# Patient Record
Sex: MALE | Race: ASIAN | Employment: STUDENT | URBAN - METROPOLITAN AREA
[De-identification: names, ages, dates, MRNs, and addresses within clinical notes are randomized per-mention and may not be internally consistent; named-entity substitution may affect disease eponyms.]

---
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
## 2021-05-26 ENCOUNTER — OFFICE VISIT (OUTPATIENT)
Dept: FAMILY MEDICINE CLINIC | Facility: CLINIC | Age: 21
End: 2021-05-26
Payer: COMMERCIAL

## 2021-05-26 VITALS
BODY MASS INDEX: 22.12 KG/M2 | HEART RATE: 80 BPM | RESPIRATION RATE: 14 BRPM | OXYGEN SATURATION: 98 % | WEIGHT: 158 LBS | DIASTOLIC BLOOD PRESSURE: 70 MMHG | HEIGHT: 71 IN | TEMPERATURE: 97 F | SYSTOLIC BLOOD PRESSURE: 100 MMHG

## 2021-05-26 DIAGNOSIS — H60.12 CELLULITIS OF LEFT EXTERNAL EAR: Primary | ICD-10-CM

## 2021-05-26 PROCEDURE — 99213 OFFICE O/P EST LOW 20 MIN: CPT | Performed by: FAMILY MEDICINE

## 2021-05-26 PROCEDURE — 3078F DIAST BP <80 MM HG: CPT | Performed by: FAMILY MEDICINE

## 2021-05-26 PROCEDURE — 3074F SYST BP LT 130 MM HG: CPT | Performed by: FAMILY MEDICINE

## 2021-05-26 PROCEDURE — 3008F BODY MASS INDEX DOCD: CPT | Performed by: FAMILY MEDICINE

## 2021-05-26 RX ORDER — AMOXICILLIN 500 MG/1
500 CAPSULE ORAL 2 TIMES DAILY
Qty: 20 CAPSULE | Refills: 0 | Status: SHIPPED | OUTPATIENT
Start: 2021-05-26 | End: 2021-06-05

## 2021-05-26 NOTE — PROGRESS NOTES
HPI/Subjective:   Patient ID: Remigio Magana is a 24year old male.     HPI  Mr. Qasim Romero is a pleasant 43-year-old gentleman who is generally healthy here today for redness and swelling of his left upper ear for the past 2 weeks associated with Requested Prescriptions     Signed Prescriptions Disp Refills   • amoxicillin 500 MG Oral Cap 20 capsule 0     Sig: Take 1 capsule (500 mg total) by mouth 2 (two) times daily for 10 days.        Imaging & Referrals:  None

## 2021-05-26 NOTE — PATIENT INSTRUCTIONS
Thank you for choosing Kaitlin Mojica MD at [place] [address]  To Do: Ulisses Medina  1. Please take meds as directed. Terrance Shultz is located in [address]. Monday, Tuesday & Friday – 8 a.m. to 4 p.m.   Wednesday, Thursday – 7 a.m. t outweigh those potential risks and we strive to make you healthier and to improve your quality of life.     Referrals, and Radiology Information:    If your insurance requires a referral to a specialist, please allow 5 business days to process your referral

## 2021-06-15 ENCOUNTER — TELEPHONE (OUTPATIENT)
Dept: FAMILY MEDICINE CLINIC | Facility: CLINIC | Age: 21
End: 2021-06-15

## 2021-06-15 NOTE — TELEPHONE ENCOUNTER
Pt would like to have a refill on the antibiotics. He states that the redness in ear  is still there.

## 2021-06-16 NOTE — TELEPHONE ENCOUNTER
Future Appointments   Date Time Provider Rocio Burt   6/23/2021  4:30 PM Emani Gusman MD [facility] [address] [facility] [address]

## 2021-06-23 ENCOUNTER — OFFICE VISIT (OUTPATIENT)
Dept: FAMILY MEDICINE CLINIC | Facility: CLINIC | Age: 21
End: 2021-06-23
Payer: COMMERCIAL

## 2021-06-23 VITALS
DIASTOLIC BLOOD PRESSURE: 80 MMHG | RESPIRATION RATE: 16 BRPM | WEIGHT: 157 LBS | HEIGHT: 71 IN | OXYGEN SATURATION: 98 % | SYSTOLIC BLOOD PRESSURE: 116 MMHG | HEART RATE: 54 BPM | BODY MASS INDEX: 21.98 KG/M2 | TEMPERATURE: 98 F

## 2021-06-23 DIAGNOSIS — H60.12 CELLULITIS OF LEFT EXTERNAL EAR: Primary | ICD-10-CM

## 2021-06-23 PROCEDURE — 3008F BODY MASS INDEX DOCD: CPT | Performed by: FAMILY MEDICINE

## 2021-06-23 PROCEDURE — 99213 OFFICE O/P EST LOW 20 MIN: CPT | Performed by: FAMILY MEDICINE

## 2021-06-23 PROCEDURE — 3079F DIAST BP 80-89 MM HG: CPT | Performed by: FAMILY MEDICINE

## 2021-06-23 PROCEDURE — 3074F SYST BP LT 130 MM HG: CPT | Performed by: FAMILY MEDICINE

## 2021-06-23 RX ORDER — DOXYCYCLINE HYCLATE 100 MG
100 TABLET ORAL 2 TIMES DAILY
Qty: 28 TABLET | Refills: 0 | Status: SHIPPED | OUTPATIENT
Start: 2021-06-23 | End: 2021-07-07

## 2021-06-23 NOTE — PROGRESS NOTES
HPI/Subjective:   Patient ID: Neo Diaz is a 24year old male. HPI  Mr. Greg Nunez is a pleasant 71-year-old gentleman who I saw last month for cellulitis of the left external ear and was treated with amoxicillin.   He thinks that that and correct errors during dictation discrepancies may still exist          No orders of the defined types were placed in this encounter.       Meds This Visit:  Requested Prescriptions     Signed Prescriptions Disp Refills   • Doxycycline Hyclate 100 MG Oral Ta

## 2021-06-23 NOTE — PATIENT INSTRUCTIONS
Thank you for choosing Ольга Caro MD at [place] [address]  To Do: Ulisses Medina  1. Please take meds as directed. Terrance Shultz is located in [address]. Monday, Tuesday & Friday – 8 a.m. to 4 p.m.   Wednesday, Thursday – 7 a.m. t outweigh those potential risks and we strive to make you healthier and to improve your quality of life.     Referrals, and Radiology Information:    If your insurance requires a referral to a specialist, please allow 5 business days to process your referral the full number of days until they are gone. Keep taking the medicine even if your symptoms go away.    Home care  Follow these tips:  · Limit the use of the part of your body with cellulitis.   · If the infection is on your leg, keep your leg raised while

## 2021-07-08 ENCOUNTER — PATIENT MESSAGE (OUTPATIENT)
Dept: FAMILY MEDICINE CLINIC | Facility: CLINIC | Age: 21
End: 2021-07-08

## 2021-07-08 DIAGNOSIS — H60.12 CELLULITIS OF LEFT EXTERNAL EAR: Primary | ICD-10-CM

## 2021-07-08 NOTE — TELEPHONE ENCOUNTER
From: Huston Schlatter  To: Sol Schmidt MD  Sent: 7/8/2021 2:42 PM CDT  Subject: Prescription Question    Hi Dr. Ena Dow,    I had an appointment with you almost 2 weeks ago regarding my ear infection.  Since then, we had switched to a different

## 2021-07-08 NOTE — TELEPHONE ENCOUNTER
Please refer to dermatology, whoever Dr. Gloria Brennan uses or Dr. Palma Cota if not sure who this is.

## 2021-07-08 NOTE — TELEPHONE ENCOUNTER
See pt message, please advise, pt to make appointment, refer to ENT or dermatology?     Per last OV note:    Cellulitis of left external ear  (primary encounter diagnosis)  -Unclear if this is truly infectious; however I will put on trial of doxycycline 100

## 2022-01-10 ENCOUNTER — TELEMEDICINE (OUTPATIENT)
Dept: FAMILY MEDICINE CLINIC | Facility: CLINIC | Age: 22
End: 2022-01-10

## 2022-01-10 DIAGNOSIS — R05.3 CHRONIC COUGH: Primary | ICD-10-CM

## 2022-01-10 PROCEDURE — 99214 OFFICE O/P EST MOD 30 MIN: CPT | Performed by: FAMILY MEDICINE

## 2022-01-10 RX ORDER — AZITHROMYCIN 250 MG/1
TABLET, FILM COATED ORAL
Qty: 6 TABLET | Refills: 0 | Status: SHIPPED | OUTPATIENT
Start: 2022-01-10 | End: 2022-01-15

## 2022-01-10 RX ORDER — MONTELUKAST SODIUM 10 MG/1
10 TABLET ORAL DAILY
Qty: 30 TABLET | Refills: 3 | Status: SHIPPED | OUTPATIENT
Start: 2022-01-10 | End: 2023-01-05

## 2022-01-10 NOTE — PROGRESS NOTES
Subjective:   Patient ID: Kimani Mullen is a 22year old male. DUSTY Kebede is a very pleasant 27-year-old gentleman who is generally healthy presented for video visit today. He has had dry cough for the past 5 months.   He had viral URI prior have been corrected.  While every attempt is made to correct errors during dictation discrepancies may still exist        Total face to face time was 15 min, more than 50% of the time was spent in counseling and/or coordination of care related to chronic co

## 2022-01-10 NOTE — PATIENT INSTRUCTIONS
Thank you for choosing Katarina Carlso MD at [place] [address]  To Do: Ulisses Medina  1. Please take meds as directed. Terrance Vikas Martin is located in [address]. Monday, Tuesday & Friday – 8 a.m. to 4 p.m.   Wednesday, Thursday – 7 a.m. t outweigh those potential risks and we strive to make you healthier and to improve your quality of life.     Referrals, and Radiology Information:    If your insurance requires a referral to a specialist, please allow 5 business days to process your referral

## 2022-06-30 ENCOUNTER — TELEMEDICINE (OUTPATIENT)
Dept: FAMILY MEDICINE CLINIC | Facility: CLINIC | Age: 22
End: 2022-06-30

## 2022-06-30 DIAGNOSIS — K13.0 MUCOCELE OF LOWER LIP: Primary | ICD-10-CM

## 2022-12-21 ENCOUNTER — TELEPHONE (OUTPATIENT)
Dept: FAMILY MEDICINE CLINIC | Facility: CLINIC | Age: 22
End: 2022-12-21

## 2022-12-21 DIAGNOSIS — Z00.00 ROUTINE ADULT HEALTH MAINTENANCE: Primary | ICD-10-CM

## 2022-12-21 NOTE — TELEPHONE ENCOUNTER
Pt requesting lab orders prior to upcoming appointment. Pt has not had labs, ok to order annual labs?

## 2022-12-21 NOTE — TELEPHONE ENCOUNTER
Future Appointments   Date Time Provider Rocio Carmel   1/16/2023  1:20 PM Yadira De La Cruz MD [facility] [address] [facility] [address]     Patient requesting lab orders, please advise.

## 2023-01-13 ENCOUNTER — OFFICE VISIT (OUTPATIENT)
Dept: FAMILY MEDICINE CLINIC | Facility: CLINIC | Age: 23
End: 2023-01-13
Payer: COMMERCIAL

## 2023-01-13 VITALS
WEIGHT: 166 LBS | DIASTOLIC BLOOD PRESSURE: 70 MMHG | OXYGEN SATURATION: 99 % | SYSTOLIC BLOOD PRESSURE: 120 MMHG | RESPIRATION RATE: 16 BRPM | HEIGHT: 71 IN | TEMPERATURE: 97 F | BODY MASS INDEX: 23.24 KG/M2 | HEART RATE: 71 BPM

## 2023-01-13 DIAGNOSIS — R68.89 NASAL AIRWAY ABNORMALITY: ICD-10-CM

## 2023-01-13 DIAGNOSIS — N50.89 SCROTAL SWELLING: ICD-10-CM

## 2023-01-13 DIAGNOSIS — Z00.00 WELL ADULT EXAM: Primary | ICD-10-CM

## 2023-01-13 DIAGNOSIS — E55.9 VITAMIN D DEFICIENCY: ICD-10-CM

## 2023-01-13 PROCEDURE — 3078F DIAST BP <80 MM HG: CPT | Performed by: FAMILY MEDICINE

## 2023-01-13 PROCEDURE — 3008F BODY MASS INDEX DOCD: CPT | Performed by: FAMILY MEDICINE

## 2023-01-13 PROCEDURE — 3074F SYST BP LT 130 MM HG: CPT | Performed by: FAMILY MEDICINE

## 2023-01-13 PROCEDURE — 99395 PREV VISIT EST AGE 18-39: CPT | Performed by: FAMILY MEDICINE

## 2023-01-13 PROCEDURE — 99213 OFFICE O/P EST LOW 20 MIN: CPT | Performed by: FAMILY MEDICINE

## 2023-01-13 RX ORDER — KETOCONAZOLE 20 MG/ML
SHAMPOO TOPICAL
COMMUNITY
Start: 2023-01-12

## 2023-08-18 ENCOUNTER — LAB ENCOUNTER (OUTPATIENT)
Dept: LAB | Age: 23
End: 2023-08-18
Attending: FAMILY MEDICINE
Payer: COMMERCIAL

## 2023-08-18 DIAGNOSIS — Z00.00 WELL ADULT EXAM: ICD-10-CM

## 2023-08-18 DIAGNOSIS — E55.9 VITAMIN D DEFICIENCY: ICD-10-CM

## 2023-08-18 LAB
ALBUMIN SERPL-MCNC: 4.2 G/DL (ref 3.4–5)
ALBUMIN/GLOB SERPL: 1.2 {RATIO} (ref 1–2)
ALP LIVER SERPL-CCNC: 76 U/L
ALT SERPL-CCNC: 40 U/L
ANION GAP SERPL CALC-SCNC: 4 MMOL/L (ref 0–18)
AST SERPL-CCNC: 28 U/L (ref 15–37)
BASOPHILS # BLD AUTO: 0.05 X10(3) UL (ref 0–0.2)
BASOPHILS NFR BLD AUTO: 0.9 %
BILIRUB SERPL-MCNC: 0.6 MG/DL (ref 0.1–2)
BUN BLD-MCNC: 21 MG/DL (ref 7–18)
CALCIUM BLD-MCNC: 9.5 MG/DL (ref 8.5–10.1)
CHLORIDE SERPL-SCNC: 106 MMOL/L (ref 98–112)
CHOLEST SERPL-MCNC: 184 MG/DL (ref ?–200)
CO2 SERPL-SCNC: 28 MMOL/L (ref 21–32)
CREAT BLD-MCNC: 1.23 MG/DL
EGFRCR SERPLBLD CKD-EPI 2021: 86 ML/MIN/1.73M2 (ref 60–?)
EOSINOPHIL # BLD AUTO: 0.38 X10(3) UL (ref 0–0.7)
EOSINOPHIL NFR BLD AUTO: 6.9 %
ERYTHROCYTE [DISTWIDTH] IN BLOOD BY AUTOMATED COUNT: 12.6 %
EST. AVERAGE GLUCOSE BLD GHB EST-MCNC: 114 MG/DL (ref 68–126)
FASTING PATIENT LIPID ANSWER: YES
FASTING STATUS PATIENT QL REPORTED: YES
GLOBULIN PLAS-MCNC: 3.4 G/DL (ref 2.8–4.4)
GLUCOSE BLD-MCNC: 86 MG/DL (ref 70–99)
HBA1C MFR BLD: 5.6 % (ref ?–5.7)
HCT VFR BLD AUTO: 51.4 %
HDLC SERPL-MCNC: 67 MG/DL (ref 40–59)
HGB BLD-MCNC: 17.3 G/DL
IMM GRANULOCYTES # BLD AUTO: 0.07 X10(3) UL (ref 0–1)
IMM GRANULOCYTES NFR BLD: 1.3 %
LDLC SERPL CALC-MCNC: 102 MG/DL (ref ?–100)
LYMPHOCYTES # BLD AUTO: 1.79 X10(3) UL (ref 1–4)
LYMPHOCYTES NFR BLD AUTO: 32.6 %
MCH RBC QN AUTO: 29.2 PG (ref 26–34)
MCHC RBC AUTO-ENTMCNC: 33.7 G/DL (ref 31–37)
MCV RBC AUTO: 86.8 FL
MONOCYTES # BLD AUTO: 0.46 X10(3) UL (ref 0.1–1)
MONOCYTES NFR BLD AUTO: 8.4 %
NEUTROPHILS # BLD AUTO: 2.74 X10 (3) UL (ref 1.5–7.7)
NEUTROPHILS # BLD AUTO: 2.74 X10(3) UL (ref 1.5–7.7)
NEUTROPHILS NFR BLD AUTO: 49.9 %
NONHDLC SERPL-MCNC: 117 MG/DL (ref ?–130)
OSMOLALITY SERPL CALC.SUM OF ELEC: 288 MOSM/KG (ref 275–295)
PLATELET # BLD AUTO: 196 10(3)UL (ref 150–450)
POTASSIUM SERPL-SCNC: 4.2 MMOL/L (ref 3.5–5.1)
PROT SERPL-MCNC: 7.6 G/DL (ref 6.4–8.2)
RBC # BLD AUTO: 5.92 X10(6)UL
SODIUM SERPL-SCNC: 138 MMOL/L (ref 136–145)
TRIGL SERPL-MCNC: 81 MG/DL (ref 30–149)
TSI SER-ACNC: 2.43 MIU/ML (ref 0.36–3.74)
VIT D+METAB SERPL-MCNC: 30.8 NG/ML (ref 30–100)
VLDLC SERPL CALC-MCNC: 13 MG/DL (ref 0–30)
WBC # BLD AUTO: 5.5 X10(3) UL (ref 4–11)

## 2023-08-18 PROCEDURE — 80050 GENERAL HEALTH PANEL: CPT | Performed by: FAMILY MEDICINE

## 2023-08-18 PROCEDURE — 82306 VITAMIN D 25 HYDROXY: CPT | Performed by: FAMILY MEDICINE

## 2023-08-18 PROCEDURE — 83036 HEMOGLOBIN GLYCOSYLATED A1C: CPT | Performed by: FAMILY MEDICINE

## 2023-08-18 PROCEDURE — 80061 LIPID PANEL: CPT | Performed by: FAMILY MEDICINE

## 2024-05-07 ENCOUNTER — OFFICE VISIT (OUTPATIENT)
Facility: LOCATION | Age: 24
End: 2024-05-07
Payer: COMMERCIAL

## 2024-05-07 ENCOUNTER — TELEPHONE (OUTPATIENT)
Facility: LOCATION | Age: 24
End: 2024-05-07

## 2024-05-07 VITALS — HEART RATE: 50 BPM | TEMPERATURE: 97 F

## 2024-05-07 DIAGNOSIS — K40.90 UNILATERAL INGUINAL HERNIA WITHOUT OBSTRUCTION OR GANGRENE, RECURRENCE NOT SPECIFIED: Primary | ICD-10-CM

## 2024-05-07 PROCEDURE — 99204 OFFICE O/P NEW MOD 45 MIN: CPT | Performed by: STUDENT IN AN ORGANIZED HEALTH CARE EDUCATION/TRAINING PROGRAM

## 2024-05-07 NOTE — TELEPHONE ENCOUNTER
Patient dropped off imaging with Radiology. Radiology says that it's in the system, but not to check Epic, that it would be in the PAC system. Patient wants to know if we have the imaging.     Please advise  Best callback number is [phone]

## 2024-05-08 ENCOUNTER — TELEPHONE (OUTPATIENT)
Facility: LOCATION | Age: 24
End: 2024-05-08

## 2024-05-08 NOTE — TELEPHONE ENCOUNTER
No prior authorization required for cpt code 55553. Spoke with Eli @ 8:23am on 5/8/24   02-Aug-2018 16:50 03-Aug-2018 20:53

## 2024-05-19 ENCOUNTER — ANESTHESIA EVENT (OUTPATIENT)
Dept: SURGERY | Facility: HOSPITAL | Age: 24
End: 2024-05-19

## 2024-05-19 NOTE — ANESTHESIA PREPROCEDURE EVALUATION
PRE-OP EVALUATION    Patient Name: Ulisses Medina    Admit Diagnosis: Unilateral inguinal hernia without obstruction or gangrene, recurrence not specified [K40.90]    Pre-op Diagnosis: Unilateral inguinal hernia without obstruction or gangrene, recurrence not specified [K40.90]    LEFT INGUINAL HERNIA REPAIR WITH MESH PLUG    Anesthesia Procedure: LEFT INGUINAL HERNIA REPAIR WITH MESH PLUG (Left)    Surgeons and Role:     * Josias Kothari MD - Primary    Pre-op vitals reviewed.        Body mass index is 24.05 kg/m².    Current medications reviewed.  Hospital Medications:  No current facility-administered medications on file as of .       Outpatient Medications:     No medications prior to admission.       Allergies: Patient has no known allergies.      Anesthesia Evaluation    Patient summary reviewed.    Anesthetic Complications  (-) history of anesthetic complications         GI/Hepatic/Renal    Negative GI/hepatic/renal ROS.                             Cardiovascular    Negative cardiovascular ROS.                                                   Endo/Other    Negative endo/other ROS.                              Pulmonary    Negative pulmonary ROS.                       Neuro/Psych    Negative neuro/psych ROS.                                  Past Surgical History:   Procedure Laterality Date    Patient denies any surgical history       Social History     Socioeconomic History    Marital status: Single   Tobacco Use    Smoking status: Never     Passive exposure: Never    Smokeless tobacco: Never   Vaping Use    Vaping status: Never Used   Substance and Sexual Activity    Alcohol use: Never    Drug use: Never     History   Drug Use Unknown     Available pre-op labs reviewed.               Airway      Mallampati: I  Mouth opening: >3 FB  TM distance: > 6 cm  Neck ROM: full Cardiovascular    Cardiovascular exam normal.         Dental    Dentition appears grossly intact          Pulmonary    Pulmonary exam normal.                 Other findings              ASA: 1   Plan: general  NPO status verified and           Plan/risks discussed with: patient                Present on Admission:  **None**

## 2024-05-20 ENCOUNTER — HOSPITAL ENCOUNTER (OUTPATIENT)
Facility: HOSPITAL | Age: 24
Setting detail: HOSPITAL OUTPATIENT SURGERY
Discharge: HOME | End: 2024-05-20
Attending: STUDENT IN AN ORGANIZED HEALTH CARE EDUCATION/TRAINING PROGRAM | Admitting: STUDENT IN AN ORGANIZED HEALTH CARE EDUCATION/TRAINING PROGRAM

## 2024-05-20 ENCOUNTER — ANESTHESIA (OUTPATIENT)
Dept: SURGERY | Facility: HOSPITAL | Age: 24
End: 2024-05-20

## 2024-05-20 VITALS
WEIGHT: 176.38 LBS | DIASTOLIC BLOOD PRESSURE: 72 MMHG | TEMPERATURE: 98 F | HEART RATE: 80 BPM | OXYGEN SATURATION: 97 % | BODY MASS INDEX: 24.97 KG/M2 | SYSTOLIC BLOOD PRESSURE: 120 MMHG | RESPIRATION RATE: 18 BRPM | HEIGHT: 70.5 IN

## 2024-05-20 DIAGNOSIS — K40.90 UNILATERAL INGUINAL HERNIA WITHOUT OBSTRUCTION OR GANGRENE, RECURRENCE NOT SPECIFIED: ICD-10-CM

## 2024-05-20 DIAGNOSIS — G89.18 POSTOPERATIVE PAIN: Primary | ICD-10-CM

## 2024-05-20 PROCEDURE — 88304 TISSUE EXAM BY PATHOLOGIST: CPT | Performed by: STUDENT IN AN ORGANIZED HEALTH CARE EDUCATION/TRAINING PROGRAM

## 2024-05-20 PROCEDURE — 0YU60JZ SUPPLEMENT LEFT INGUINAL REGION WITH SYNTHETIC SUBSTITUTE, OPEN APPROACH: ICD-10-PCS | Performed by: STUDENT IN AN ORGANIZED HEALTH CARE EDUCATION/TRAINING PROGRAM

## 2024-05-20 PROCEDURE — 88307 TISSUE EXAM BY PATHOLOGIST: CPT | Performed by: STUDENT IN AN ORGANIZED HEALTH CARE EDUCATION/TRAINING PROGRAM

## 2024-05-20 DEVICE — BARD MESH
Type: IMPLANTABLE DEVICE | Site: INGUINAL | Status: FUNCTIONAL
Brand: BARD MESH

## 2024-05-20 RX ORDER — PROCHLORPERAZINE EDISYLATE 5 MG/ML
5 INJECTION INTRAMUSCULAR; INTRAVENOUS EVERY 8 HOURS PRN
Status: DISCONTINUED | OUTPATIENT
Start: 2024-05-20 | End: 2024-05-20

## 2024-05-20 RX ORDER — SODIUM CHLORIDE, SODIUM LACTATE, POTASSIUM CHLORIDE, CALCIUM CHLORIDE 600; 310; 30; 20 MG/100ML; MG/100ML; MG/100ML; MG/100ML
INJECTION, SOLUTION INTRAVENOUS CONTINUOUS
Status: DISCONTINUED | OUTPATIENT
Start: 2024-05-20 | End: 2024-05-20

## 2024-05-20 RX ORDER — ONDANSETRON 2 MG/ML
4 INJECTION INTRAMUSCULAR; INTRAVENOUS EVERY 6 HOURS PRN
Status: DISCONTINUED | OUTPATIENT
Start: 2024-05-20 | End: 2024-05-20

## 2024-05-20 RX ORDER — KETOROLAC TROMETHAMINE 30 MG/ML
INJECTION, SOLUTION INTRAMUSCULAR; INTRAVENOUS AS NEEDED
Status: DISCONTINUED | OUTPATIENT
Start: 2024-05-20 | End: 2024-05-20 | Stop reason: SURG

## 2024-05-20 RX ORDER — LIDOCAINE HYDROCHLORIDE 10 MG/ML
INJECTION, SOLUTION EPIDURAL; INFILTRATION; INTRACAUDAL; PERINEURAL AS NEEDED
Status: DISCONTINUED | OUTPATIENT
Start: 2024-05-20 | End: 2024-05-20 | Stop reason: SURG

## 2024-05-20 RX ORDER — NALOXONE HYDROCHLORIDE 0.4 MG/ML
0.08 INJECTION, SOLUTION INTRAMUSCULAR; INTRAVENOUS; SUBCUTANEOUS AS NEEDED
Status: DISCONTINUED | OUTPATIENT
Start: 2024-05-20 | End: 2024-05-20

## 2024-05-20 RX ORDER — HYDROCODONE BITARTRATE AND ACETAMINOPHEN 5; 325 MG/1; MG/1
2 TABLET ORAL ONCE AS NEEDED
Status: COMPLETED | OUTPATIENT
Start: 2024-05-20 | End: 2024-05-20

## 2024-05-20 RX ORDER — HYDROMORPHONE HYDROCHLORIDE 1 MG/ML
0.6 INJECTION, SOLUTION INTRAMUSCULAR; INTRAVENOUS; SUBCUTANEOUS EVERY 5 MIN PRN
Status: DISCONTINUED | OUTPATIENT
Start: 2024-05-20 | End: 2024-05-20

## 2024-05-20 RX ORDER — OXYCODONE HYDROCHLORIDE 5 MG/1
5 TABLET ORAL EVERY 6 HOURS PRN
Qty: 15 TABLET | Refills: 0 | Status: SHIPPED | OUTPATIENT
Start: 2024-05-20

## 2024-05-20 RX ORDER — ACETAMINOPHEN 500 MG
1000 TABLET ORAL ONCE
Status: DISCONTINUED | OUTPATIENT
Start: 2024-05-20 | End: 2024-05-20 | Stop reason: HOSPADM

## 2024-05-20 RX ORDER — MIDAZOLAM HYDROCHLORIDE 1 MG/ML
1 INJECTION INTRAMUSCULAR; INTRAVENOUS EVERY 5 MIN PRN
Status: DISCONTINUED | OUTPATIENT
Start: 2024-05-20 | End: 2024-05-20

## 2024-05-20 RX ORDER — ONDANSETRON 2 MG/ML
INJECTION INTRAMUSCULAR; INTRAVENOUS AS NEEDED
Status: DISCONTINUED | OUTPATIENT
Start: 2024-05-20 | End: 2024-05-20 | Stop reason: SURG

## 2024-05-20 RX ORDER — HYDROCODONE BITARTRATE AND ACETAMINOPHEN 5; 325 MG/1; MG/1
1 TABLET ORAL ONCE AS NEEDED
Status: COMPLETED | OUTPATIENT
Start: 2024-05-20 | End: 2024-05-20

## 2024-05-20 RX ORDER — BUPIVACAINE HYDROCHLORIDE 2.5 MG/ML
INJECTION, SOLUTION EPIDURAL; INFILTRATION; INTRACAUDAL AS NEEDED
Status: DISCONTINUED | OUTPATIENT
Start: 2024-05-20 | End: 2024-05-20 | Stop reason: HOSPADM

## 2024-05-20 RX ORDER — HYDROMORPHONE HYDROCHLORIDE 1 MG/ML
0.2 INJECTION, SOLUTION INTRAMUSCULAR; INTRAVENOUS; SUBCUTANEOUS EVERY 5 MIN PRN
Status: DISCONTINUED | OUTPATIENT
Start: 2024-05-20 | End: 2024-05-20

## 2024-05-20 RX ORDER — DEXAMETHASONE SODIUM PHOSPHATE 4 MG/ML
VIAL (ML) INJECTION AS NEEDED
Status: DISCONTINUED | OUTPATIENT
Start: 2024-05-20 | End: 2024-05-20 | Stop reason: SURG

## 2024-05-20 RX ORDER — ACETAMINOPHEN 500 MG
1000 TABLET ORAL ONCE AS NEEDED
Status: COMPLETED | OUTPATIENT
Start: 2024-05-20 | End: 2024-05-20

## 2024-05-20 RX ORDER — LABETALOL HYDROCHLORIDE 5 MG/ML
5 INJECTION, SOLUTION INTRAVENOUS EVERY 5 MIN PRN
Status: DISCONTINUED | OUTPATIENT
Start: 2024-05-20 | End: 2024-05-20

## 2024-05-20 RX ORDER — IBUPROFEN 600 MG/1
600 TABLET ORAL ONCE AS NEEDED
Status: DISCONTINUED | OUTPATIENT
Start: 2024-05-20 | End: 2024-05-20

## 2024-05-20 RX ORDER — HYDROMORPHONE HYDROCHLORIDE 1 MG/ML
INJECTION, SOLUTION INTRAMUSCULAR; INTRAVENOUS; SUBCUTANEOUS
Status: COMPLETED
Start: 2024-05-20 | End: 2024-05-20

## 2024-05-20 RX ORDER — HYDROMORPHONE HYDROCHLORIDE 1 MG/ML
0.4 INJECTION, SOLUTION INTRAMUSCULAR; INTRAVENOUS; SUBCUTANEOUS EVERY 5 MIN PRN
Status: DISCONTINUED | OUTPATIENT
Start: 2024-05-20 | End: 2024-05-20

## 2024-05-20 RX ORDER — ROCURONIUM BROMIDE 10 MG/ML
INJECTION, SOLUTION INTRAVENOUS AS NEEDED
Status: DISCONTINUED | OUTPATIENT
Start: 2024-05-20 | End: 2024-05-20 | Stop reason: SURG

## 2024-05-20 RX ORDER — MEPERIDINE HYDROCHLORIDE 25 MG/ML
INJECTION INTRAMUSCULAR; INTRAVENOUS; SUBCUTANEOUS
Status: COMPLETED
Start: 2024-05-20 | End: 2024-05-20

## 2024-05-20 RX ORDER — SCOLOPAMINE TRANSDERMAL SYSTEM 1 MG/1
1 PATCH, EXTENDED RELEASE TRANSDERMAL ONCE
Status: DISCONTINUED | OUTPATIENT
Start: 2024-05-20 | End: 2024-05-20 | Stop reason: HOSPADM

## 2024-05-20 RX ORDER — MIDAZOLAM HYDROCHLORIDE 1 MG/ML
INJECTION INTRAMUSCULAR; INTRAVENOUS AS NEEDED
Status: DISCONTINUED | OUTPATIENT
Start: 2024-05-20 | End: 2024-05-20 | Stop reason: SURG

## 2024-05-20 RX ORDER — HEPARIN SODIUM 5000 [USP'U]/ML
5000 INJECTION, SOLUTION INTRAVENOUS; SUBCUTANEOUS ONCE
Status: COMPLETED | OUTPATIENT
Start: 2024-05-20 | End: 2024-05-20

## 2024-05-20 RX ORDER — CEFAZOLIN SODIUM 1 G/3ML
INJECTION, POWDER, FOR SOLUTION INTRAMUSCULAR; INTRAVENOUS AS NEEDED
Status: DISCONTINUED | OUTPATIENT
Start: 2024-05-20 | End: 2024-05-20 | Stop reason: SURG

## 2024-05-20 RX ORDER — MEPERIDINE HYDROCHLORIDE 25 MG/ML
12.5 INJECTION INTRAMUSCULAR; INTRAVENOUS; SUBCUTANEOUS AS NEEDED
Status: DISCONTINUED | OUTPATIENT
Start: 2024-05-20 | End: 2024-05-20

## 2024-05-20 RX ADMIN — ONDANSETRON 4 MG: 2 INJECTION INTRAMUSCULAR; INTRAVENOUS at 09:59:00

## 2024-05-20 RX ADMIN — DEXAMETHASONE SODIUM PHOSPHATE 4 MG: 4 MG/ML VIAL (ML) INJECTION at 09:58:00

## 2024-05-20 RX ADMIN — SODIUM CHLORIDE, SODIUM LACTATE, POTASSIUM CHLORIDE, CALCIUM CHLORIDE: 600; 310; 30; 20 INJECTION, SOLUTION INTRAVENOUS at 11:54:00

## 2024-05-20 RX ADMIN — SODIUM CHLORIDE, SODIUM LACTATE, POTASSIUM CHLORIDE, CALCIUM CHLORIDE: 600; 310; 30; 20 INJECTION, SOLUTION INTRAVENOUS at 09:45:00

## 2024-05-20 RX ADMIN — CEFAZOLIN SODIUM 2 G: 1 INJECTION, POWDER, FOR SOLUTION INTRAMUSCULAR; INTRAVENOUS at 10:04:00

## 2024-05-20 RX ADMIN — LIDOCAINE HYDROCHLORIDE 100 MG: 10 INJECTION, SOLUTION EPIDURAL; INFILTRATION; INTRACAUDAL; PERINEURAL at 09:52:00

## 2024-05-20 RX ADMIN — ROCURONIUM BROMIDE 50 MG: 10 INJECTION, SOLUTION INTRAVENOUS at 09:53:00

## 2024-05-20 RX ADMIN — SODIUM CHLORIDE, SODIUM LACTATE, POTASSIUM CHLORIDE, CALCIUM CHLORIDE: 600; 310; 30; 20 INJECTION, SOLUTION INTRAVENOUS at 11:46:00

## 2024-05-20 RX ADMIN — KETOROLAC TROMETHAMINE 30 MG: 30 INJECTION, SOLUTION INTRAMUSCULAR; INTRAVENOUS at 11:39:00

## 2024-05-20 RX ADMIN — MIDAZOLAM HYDROCHLORIDE 2 MG: 1 INJECTION INTRAMUSCULAR; INTRAVENOUS at 09:47:00

## 2024-05-20 NOTE — ANESTHESIA POSTPROCEDURE EVALUATION
[facility] Demonddebbie Medina Patient Status:  Hospital Outpatient Surgery   Age/Gender 23 year old male MRN [id number]   Location [facility] POST ANESTHESIA CARE UNIT Attending Josias Kothari MD   Hosp Day # 0 PCP Edinson Sanon MD       Anesthesia Post-op Note    LEFT INGUINAL HERNIA REPAIR WITH MESH    Procedure Summary       Date: 05/20/24 Room / Location:  MAIN OR 04 /  MAIN OR    Anesthesia Start: 0945 Anesthesia Stop: 1206    Procedure: LEFT INGUINAL HERNIA REPAIR WITH MESH (Left: Abdomen) Diagnosis:       Unilateral inguinal hernia without obstruction or gangrene, recurrence not specified      (Unilateral inguinal hernia without obstruction or gangrene, recurrence not specified [K40.90])    Surgeons: Josias Kothari MD Anesthesiologist: Ganesh Wood MD    Anesthesia Type: general ASA Status: 1            Anesthesia Type: general    Vitals Value Taken Time   /66 05/20/24 1204   Temp 98.3 05/20/24 1206   Pulse 62 05/20/24 1205   Resp 13 05/20/24 1205   SpO2 100 % 05/20/24 1205   Vitals shown include unfiled device data.    Patient Location: PACU    Anesthesia Type: general    Airway Patency: patent, extubated and oral/nasal airway    Postop Pain Control: adequate    Mental Status: sedated until time of extubation    Nausea/Vomiting: none    Cardiopulmonary/Hydration status: stable euvolemic    Complications: no apparent anesthesia related complications    Postop vital signs: stable    Comments: Report to Domenico PACU RN.    Dental Exam: Unchanged from Preop    Patient to be discharged from PACU when criteria met.

## 2024-05-20 NOTE — ANESTHESIA PROCEDURE NOTES
Airway  Date/Time: 5/20/2024 9:55 AM  Urgency: elective    Airway not difficult    General Information and Staff    Patient location during procedure: OR  Anesthesiologist: Ganesh Wood MD  Resident/CRNA: Fouzia Menchaca CRNA  Performed: CRNA   Performed by: Fouzia Menchaca CRNA  Authorized by: Ganesh Wood MD      Indications and Patient Condition  Indications for airway management: anesthesia  Spontaneous Ventilation: absent  Sedation level: deep  Preoxygenated: yes  Patient position: sniffing  Mask difficulty assessment: 1 - vent by mask    Final Airway Details  Final airway type: endotracheal airway      Successful airway: ETT  Cuffed: yes   Successful intubation technique: direct laryngoscopy  Endotracheal tube insertion site: oral  Blade: Marleni  Blade size: #3  ETT size (mm): 7.5    Cormack-Lehane Classification: grade I - full view of glottis  Placement verified by: capnometry   Measured from: lips  ETT to lips (cm): 22  Number of attempts at approach: 1    Additional Comments  Atraumatic intubation. Dentition and OP as per preop.

## 2024-05-20 NOTE — H&P
New Patient Visit Note       Active Problems      No diagnosis found.    Chief Complaint   Left groin bulge      History of Present Illness   23 year old male who is here for evaluation of a left groin bulge. He reports having noted a bulge in his left groin for some time. He reports minimal discomfort in the area. The bulge has been growing in size and now goes into the scrotum. He has not been able to reduce it lately. He denies any other symptoms of nausea or vomiting, constipation. He has not had abdominal surgery in the past.       Allergies  Ulisses has No Known Allergies.    Past Medical / Surgical / Social / Family History    The past medical and past surgical history have been reviewed by me today.    Past Medical History:    Allergic rhinitis    spring and fall    Visual impairment    CONTACTS     Past Surgical History:   Procedure Laterality Date    Patient denies any surgical history         The family history and social history have been reviewed by me today.    Family History   Problem Relation Age of Onset    Diabetes Maternal Grandfather     High Cholesterol Maternal Grandfather     Other (Other) Mother         allergic rhinitis     Social History     Socioeconomic History    Marital status: Single   Tobacco Use    Smoking status: Never     Passive exposure: Never    Smokeless tobacco: Never   Vaping Use    Vaping status: Never Used   Substance and Sexual Activity    Alcohol use: Never    Drug use: Never      No current outpatient medications on file.      Review of Systems  The Review of Systems has been reviewed by me during today.  Review of Systems   Constitutional:  Negative for chills, diaphoresis, fatigue and fever.   HENT:  Negative for ear discharge, ear pain and sore throat.    Eyes:  Negative for pain and discharge.   Respiratory:  Negative for cough, chest tightness and shortness of breath.    Cardiovascular:  Negative for chest pain, palpitations and leg swelling.   Gastrointestinal:   Negative for abdominal distention, abdominal pain, blood in stool, constipation, diarrhea, nausea and vomiting.   Genitourinary:  Negative for dysuria, frequency, hematuria and urgency.   Skin:  Negative for color change, pallor and rash.   Neurological:  Negative for weakness, light-headedness, numbness and headaches.   Hematological:  Negative for adenopathy. Does not bruise/bleed easily.   Psychiatric/Behavioral:  Negative for agitation and confusion.        Physical Findings   /77 (BP Location: Left arm)   Pulse 63   Temp 98.3 °F (36.8 °C) (Oral)   Resp 16   Ht 70.5\"   Wt 176 lb 6.4 oz (80 kg)   SpO2 100%   BMI 24.95 kg/m²   Physical Exam  Constitutional:       Appearance: Normal appearance.   HENT:      Head: Normocephalic and atraumatic.   Cardiovascular:      Pulses: Normal pulses.   Pulmonary:      Effort: Pulmonary effort is normal.   Abdominal:      General: Abdomen is flat. There is no distension.      Palpations: Abdomen is soft.      Tenderness: There is no guarding or rebound.      Hernia: A hernia is present. Hernia is present in the left inguinal area. There is no hernia in the right inguinal area.          Comments: Left inguinoscroctal hernia that is incarcerated and not reducible and tender to the touch.   Skin:     General: Skin is warm.      Capillary Refill: Capillary refill takes less than 2 seconds.   Neurological:      Mental Status: He is alert and oriented to person, place, and time. Mental status is at baseline.             Assessment/Plan  Left inguinal hernia, non recurrent, incarcerated     Ulisses Medina is a 23 year old male referred by ezekiel Sanon for evaluation of left groin bulge.  There is a left inguinoscrotal hernia that is non reducible. I discussed with the patient the pathophysiology of the disease and the symptoms. He has been avoiding any heavy lifting due to concerns of discomfort and making it worse. He wants to have it repaired. I discussed  with him the different approaches of repair and decided to proceed with open left inguinal hernia repair with mesh. I discussed with him the post operative expectations, risks and benefits of surgery in detail and answered all his questions. Will proceed with planned procedure.        Josias Kothari MD  [facility] General Surgery

## 2024-05-20 NOTE — DISCHARGE INSTRUCTIONS
Home Care Instructions  Inguinal Hernia Repair  Dr Josias Kothari        WHAT TO EXPECT  You may feel pain at the incisions or where your hernia used to be. This is due to stitches placed during the surgery.    You may feel pain and bruising in the groin. You may notice swelling of the scrotum. This is common and will resolve.    You may feel a sore throat. This is due to the breathing tube used during surgery.     You may feel mild nausea and vomiting for the first 24 hours, this should resolve quickly.    You may have constipation, especially if taking narcotic pain medications. If you have not had a bowel movement by 48 hours after surgery, take Miralax 17g (one cap full) every 12 hours until you have a bowel movement. If another 24 hours goes by without a bowel movement, then take a dose of magnesium citrate or milk of magnesia.     MEDICATIONS  Take 2 Extra Strength Tylenol (1000mg every) 8 hours for pain. For the first 3 days it is best to take the Tylenol every 8 hours even if you do not feel much pain. You took Tylenol 500 mg at 1:45pm.    Take Advil (ibuprofen) 800mg every 8 hours or Alleve (naproxen) 500mg every 12 hours, also for the first 3 days. You may take ibuprofen after 5:40pm.    For moderate to severe pain take one Oxycodone pill (5mg) every six hours as needed for pain. If you do not feel that narcotics are necessary you shouldn’t take them. If the pain is severe you can take two pills (10mg) every six hours.    All other home medications may be resumed as scheduled.     Do not exceed a total of 4000 mg of Acetaminophen from all sources within 24 hours.     DIET  Start with a light and bland diet and slowly advance to regular food as your appetite improves. There are no specific food restrictions. Do not eat excessively. Eat small frequent meals.     Drink plenty of water. Try to eat a healthy high fiber diet.    Do not drink alcohol (beer, wine, liquor) or use tobacco products.    WOUND  CARE  The incisions are covered with Skin Glue. You can shower 24 hours after surgery and get the dressings wet.    The skin glue will stay on for 10 to 14 days after surgery.     Soap and water can get on the incisions but do not scrub the wounds. No hair dye or chemicals of any kind should get on the incisions.     Do not apply any topical ointments such as Neosporin or Hydrogen Peroxide.    Do not swim or submerge the incisions under water for 1 month.    ACTIVITY  Every day you should be up walking around the house. Do not lie in bed all day. Staying active prevents blood clots and pneumonia.    You can go up and down stairs. Do not lift more than 20 pounds or perform strenuous activity that requires straining the core muscles.    You may ride in a car but should not drive the car for at least one week.     APPOINTMENT  Please call our office at [phone] soon to make an appointment.    For questions or concerns please call our office between 8:30 a.m. and 5 p.m. Monday through Friday. The number above directs to the answering service after hours to reach the on-call physician.    Please call our office immediately for fever greater than 100.5, excess bleeding, inability to urinate, severe abdominal pain, severe diarrhea, uncontrollable vomiting.      For life threatening emergencies such as severe chest pain, difficulty breathing, or loss of conciousness call [phone].

## 2024-05-22 NOTE — OPERATIVE REPORT
[facility]  Operative Note    Ulisses Medina Location: OR   CSN [id number] MRN [id number]    2000 Age 23 year old   Admission Date 2024 Operation Date 2024   Attending Physician No att. providers found Operating Physician Josias Kothari MD   PCP Edinson Sanon MD          Patient Name: Ulisses Medina    Preoperative Diagnosis: Unilateral inguinal hernia without obstruction or gangrene, recurrence not specified [K40.90]    Postoperative Diagnosis: Same as pre-op diagnosis    Surgeons and Role:     * Josias Kothari MD - Primary    Primary Surgeon: Josias Kothari MD     Assistant: Lilibeth Kowalski PA-C    Anesthesia: General    Anesthesiologist: Anesthesiologist.: Ganesh Wood MD  CRNA.: Fouzia Menchaca CRNA    Procedures: Open left inguinal hernia repair with mesh    Implants: Bard Mesh     Specimen: none     Drains: none     Estimated Blood Loss: Blood Output: 5 mL (2024 11:34 AM)         Complications: none    Condition: stable     Indications for Surgery:   Ulisses Medina is a 23 year old male who presents with a painful enlarging bulge in the left groin. Physical exam shows a left inguinal hernia. Ultrasound shows left inguinal hernia. The patient presents today for elective hernia repair.    Surgical Findings:   Left inguinal hernia     Description of Procedure:   The patient was taken to the operating room and placed on the operating table in supine position.  General endotracheal anesthesia was administered. The groin and perineum were prepped and draped in sterile fashion.  Preoperative antibiotics were given.  Timeout was performed.      An transverse incision w above the inguinal crease as created with a scalpel.  Electrocautery was used to take this down to the external oblique aponeurosis.  Subcutaneous vessels encountered were cauterized or ligated as necessary.  The external inguinal ring was identified with the associated  herniating contents.  A stab incision was made in the external oblique with a 15 Blade. Using a Metzenbaum scissors this was bluntly dissected off the underlying tissue. The external oblique fascia was incised sharply in the direction of its fibers to include the external inguinal ring. The ilioinguinal nerve was immediately identified and retracted, and protected at all times. The spermatic cord and hernia sac were encircled at the level of the pubic tubercle and a Penrose drain was used to encircle the hernia and cord structures. Meticulous dissection was undertaken to separate the hernia sac from the cord structures.  This dissection was taken down to the level of the internal inguinal ring.  The hernia sac was opened and contained omentum. The majority of the omentum was reduced into the abdominal cavity via the internal ring. A 2 cm portion of the omentum was not reducible into the abdomen and this was transected using a LigaSure device. The hernia sac was then wrapped and ligated using a 2-0 Perolene stitch.  An overlay patch was secured at the level of the pubic tubercle, transversalis fascia, as well as the shelving edge of inguinal ligament using 2-0 Prolene sutures. At this point, the cord structures were placed in their anatomic position. The two leaflets of the mesh were wrapped around the spermatic card and affixed to each other, taking care to not impinge the cord. The wound was irrigated with saline. Hemostasis was again achieved with electrocautery as necessary. The external oblique aponeurosis was reapproximated using running 2-0 Vicryl suture. The perry's fascia was reapproximated using interrupted 3-0 Vicryl suture.  The dermis was approximately with interrupted 3-0 Vicryl suture. The skin was reapproximated using running subcuticular 4-0 Monocryl suture. Further local anesthetic was injected. Skin glue was used to seal the wound.    Patient was awakened from anesthesia, and brought to the  recovery room in stable condition having tolerated the procedure without apparent complication.  All sponge, needle, and instrument counts were correct at the end of the case.     Josias Kothari MD

## 2024-06-03 ENCOUNTER — OFFICE VISIT (OUTPATIENT)
Facility: LOCATION | Age: 24
End: 2024-06-03
Payer: COMMERCIAL

## 2024-06-03 VITALS — HEART RATE: 77 BPM | TEMPERATURE: 98 F

## 2024-06-03 DIAGNOSIS — Z87.19 HISTORY OF INGUINAL HERNIA REPAIR: ICD-10-CM

## 2024-06-03 DIAGNOSIS — Z98.890 POST-OPERATIVE STATE: Primary | ICD-10-CM

## 2024-06-03 DIAGNOSIS — Z98.890 HISTORY OF INGUINAL HERNIA REPAIR: ICD-10-CM

## 2024-06-03 NOTE — PROGRESS NOTES
Post Operative Visit Note       Active Problems  1. Post-operative state    2. History of inguinal hernia repair         Chief Complaint   Chief Complaint   Patient presents with    Post-Op     PO 5/20 LEFT INGUINAL HERNIA REPAIR WITH MESH W/Saniya           History of Present Illness   The patient presents for continued care and evaluation following a open left inguinal hernia repair with mesh with Dr. Kothari on 5/22/2024.    The patient states the first few days following his operation, he was in a significant amount of pain.  He states due to his pain, he felt that he could not \"flex\" his abdominal muscles and that he could not walk fully straight.  He states he had to lean forward and utilize a chair to aid in walking.    His pain has since significantly improved, but he states he does not feel that he is able to use his abdominal muscles.  He states his pain is worse with straining.    He is tolerating a diet without nausea or vomiting.  He states he was constipated the first 2 days following his operation.  He took MiraLAX to help alleviate this.    The patient is concerned, because he has a small lump in the left scrotum.  He has followed with urology in the past and states he has been told he had a possible varicocele.  The patient states the lump was present prior to surgery and decreased in size following surgery, but has not entirely resolved.          Allergies  Ulisses has No Known Allergies.    Past Medical / Surgical / Social / Family History    The past medical and past surgical history have been reviewed by me today.     Past Medical History:    Allergic rhinitis    spring and fall    Visual impairment    CONTACTS     Past Surgical History:   Procedure Laterality Date    Patient denies any surgical history         The family history and social history have been reviewed by me today.    Family History   Problem Relation Age of Onset    Diabetes Maternal Grandfather     High Cholesterol Maternal  Grandfather     Other (Other) Mother         allergic rhinitis     Social History     Socioeconomic History    Marital status: Single   Tobacco Use    Smoking status: Never     Passive exposure: Never    Smokeless tobacco: Never   Vaping Use    Vaping status: Never Used   Substance and Sexual Activity    Alcohol use: Never    Drug use: Never        Current Outpatient Medications:     oxyCODONE 5 MG Oral Tab, Take 1 tablet (5 mg total) by mouth every 6 (six) hours as needed for Pain., Disp: 15 tablet, Rfl: 0      Review of Systems  The Review of Systems has been reviewed by me during today.  Review of Systems    Physical Findings   Pulse 77   Temp 97.9 °F (36.6 °C) (Temporal)   Physical Exam  Vitals and nursing note reviewed.   Constitutional:       General: He is not in acute distress.     Appearance: Normal appearance. He is normal weight.   HENT:      Head: Normocephalic and atraumatic.      Right Ear: External ear normal.      Left Ear: External ear normal.      Nose: Nose normal.   Eyes:      General: No scleral icterus.     Conjunctiva/sclera: Conjunctivae normal.   Abdominal:      General: Abdomen is flat. There is no distension.      Palpations: Abdomen is soft. There is no mass.      Tenderness: There is no abdominal tenderness.      Hernia: No hernia is present.      Comments: Clinical exam of the abdomen reveals it to be soft, nondistended, nontender to palpation.  The patient's incision in the left groin is clean, dry, intact without surrounding erythema or cellulitis.  There is some fullness and firmness to palpation over his incision site with a probable seroma.  There is an approximately 2 cm soft mobile lump in the left scrotum.  Possible varicocele.   Musculoskeletal:      Cervical back: Normal range of motion and neck supple.   Neurological:      Mental Status: He is alert.   Psychiatric:         Mood and Affect: Mood normal.         Behavior: Behavior normal.         Thought Content: Thought  content normal.             Assessment   1. Post-operative state    2. History of inguinal hernia repair          Plan     The patient is doing well status post open left inguinal hernia repair.    I discussed with the patient that they should refrain from any bending, pushing, pulling, twisting, or lifting of a force greater than 15-20 pounds for 6 weeks post-op. Activity restrictions were reviewed.  The patient had many questions regarding activity restrictions.  I recommend he avoid boxing for at least 6 weeks postoperatively.    The patient may shower. They should avoid scrubbing their incisions, but may allow soap and water to wash over the incisions. The patient should avoid submerging their incisions in a bath, hot tub, pool for a total of 2 weeks postoperatively.    The patient should continue a general diet.    The patient may take ibuprofen and Tylenol as needed for pain management.  They may also utilize heating pads or ice packs for comfort measures.    I would like the patient to follow-up with his urologist regarding a possible varicocele.    Additionally, the patient states Dr. Kothari instructed him to follow-up 6 weeks postoperatively.  We will schedule a follow-up with Dr. Kothari for approximately 1 month for now.    The patient does not require a return to work note.    All of the patient's questions were answered.  The patient verbalized understanding and agreement with the plan of care.         No orders of the defined types were placed in this encounter.      Imaging & Referrals   None    Follow Up  No follow-ups on file.    Prema Arriaga PA-C

## 2024-06-04 NOTE — H&P
New Patient Visit Note     Active Problems      No diagnosis found.     Chief Complaint   Left groin bulge        History of Present Illness   23 year old male who is here for evaluation of a left groin bulge. He reports having noted a bulge in his left groin for some time. He reports minimal discomfort in the area. The bulge has been growing in size and now goes into the scrotum. He has not been able to reduce it lately. He denies any other symptoms of nausea or vomiting, constipation. He has not had abdominal surgery in the past.         Allergies  Ulisses has No Known Allergies.     Past Medical / Surgical / Social / Family History    The past medical and past surgical history have been reviewed by me today.     Past Medical History       Past Medical History:    Allergic rhinitis     spring and fall    Visual impairment     CONTACTS         Past Surgical History[]Expand by Default         Past Surgical History:   Procedure Laterality Date    Patient denies any surgical history                The family history and social history have been reviewed by me today.     Family History         Family History   Problem Relation Age of Onset    Diabetes Maternal Grandfather      High Cholesterol Maternal Grandfather      Other (Other) Mother           allergic rhinitis         Social Hx on file   Social History            Socioeconomic History    Marital status: Single   Tobacco Use    Smoking status: Never       Passive exposure: Never    Smokeless tobacco: Never   Vaping Use    Vaping status: Never Used   Substance and Sexual Activity    Alcohol use: Never    Drug use: Never         Medications - Current   No current outpatient medications on file.         Review of Systems  The Review of Systems has been reviewed by me during today.  Review of Systems   Constitutional:  Negative for chills, diaphoresis, fatigue and fever.   HENT:  Negative for ear discharge, ear pain and sore throat.    Eyes:  Negative for pain and  discharge.   Respiratory:  Negative for cough, chest tightness and shortness of breath.    Cardiovascular:  Negative for chest pain, palpitations and leg swelling.   Gastrointestinal:  Negative for abdominal distention, abdominal pain, blood in stool, constipation, diarrhea, nausea and vomiting.   Genitourinary:  Negative for dysuria, frequency, hematuria and urgency.   Skin:  Negative for color change, pallor and rash.   Neurological:  Negative for weakness, light-headedness, numbness and headaches.   Hematological:  Negative for adenopathy. Does not bruise/bleed easily.   Psychiatric/Behavioral:  Negative for agitation and confusion.          Physical Findings   /77 (BP Location: Left arm)   Pulse 63   Temp 98.3 °F (36.8 °C) (Oral)   Resp 16   Ht 70.5\"   Wt 176 lb 6.4 oz (80 kg)   SpO2 100%   BMI 24.95 kg/m²   Physical Exam  Constitutional:       Appearance: Normal appearance.   HENT:      Head: Normocephalic and atraumatic.   Cardiovascular:      Pulses: Normal pulses.   Pulmonary:      Effort: Pulmonary effort is normal.   Abdominal:      General: Abdomen is flat. There is no distension.      Palpations: Abdomen is soft.      Tenderness: There is no guarding or rebound.      Hernia: A hernia is present. Hernia is present in the left inguinal area. There is no hernia in the right inguinal area.           Comments: Left inguinoscroctal hernia that is incarcerated and not reducible and tender to the touch.   Skin:     General: Skin is warm.      Capillary Refill: Capillary refill takes less than 2 seconds.   Neurological:      Mental Status: He is alert and oriented to person, place, and time. Mental status is at baseline.            Assessment/Plan  Left inguinal hernia, non recurrent, incarcerated      Ulisses Medina is a 23 year old male referred by ezekiel Sanon for evaluation of left groin bulge.  There is a left inguinoscrotal hernia that is non reducible. I discussed with the patient  the pathophysiology of the disease and the symptoms. He has been avoiding any heavy lifting due to concerns of discomfort and making it worse. He wants to have it repaired. I discussed with him the different approaches of repair and decided to proceed with open left inguinal hernia repair with mesh. I discussed with him the post operative expectations, risks and benefits of surgery in detail and answered all his questions. Will proceed with planned procedure.           Josias Kothari MD  [facility]

## 2024-07-03 ENCOUNTER — OFFICE VISIT (OUTPATIENT)
Facility: LOCATION | Age: 24
End: 2024-07-03
Payer: COMMERCIAL

## 2024-07-03 VITALS
HEART RATE: 84 BPM | WEIGHT: 176 LBS | RESPIRATION RATE: 18 BRPM | BODY MASS INDEX: 24.91 KG/M2 | TEMPERATURE: 98 F | OXYGEN SATURATION: 98 % | HEIGHT: 70.5 IN

## 2024-07-03 DIAGNOSIS — Z98.890 POST-OPERATIVE STATE: Primary | ICD-10-CM

## 2024-07-03 RX ORDER — CLINDAMYCIN AND BENZOYL PEROXIDE 10; 50 MG/G; MG/G
GEL TOPICAL
COMMUNITY
Start: 2023-05-12

## 2024-07-03 RX ORDER — ADAPALENE GEL USP, 0.3% 3 MG/G
GEL TOPICAL
COMMUNITY
Start: 2023-05-12

## 2024-07-03 NOTE — PROGRESS NOTES
Post Operative Visit Note       Active Problems  1. Post-operative state         Chief Complaint   Chief Complaint   Patient presents with    Post-Op     PO- 5- LEFT INGUINAL HERNIA REPAIR WITH MESH patient states he is at 6 week capo he has started to work out no complaints           History of Present Illness   23 year old male who is status post open left inguinal hernia repair with mesh on 5/20 presents for follow up.   Feels well. Denies any pain at this time. Denies fevers or chills. Going back to his exercise routine.       Allergies  Ulisses has No Known Allergies.    Past Medical / Surgical / Social / Family History    The past medical and past surgical history have been reviewed by me today.     Past Medical History:    Allergic rhinitis    spring and fall    Visual impairment    CONTACTS     Past Surgical History:   Procedure Laterality Date    Patient denies any surgical history         The family history and social history have been reviewed by me today.    Family History   Problem Relation Age of Onset    Diabetes Maternal Grandfather     High Cholesterol Maternal Grandfather     Other (Other) Mother         allergic rhinitis     Social History     Socioeconomic History    Marital status: Single   Tobacco Use    Smoking status: Never     Passive exposure: Never    Smokeless tobacco: Never   Vaping Use    Vaping status: Never Used   Substance and Sexual Activity    Alcohol use: Never    Drug use: Never        Current Outpatient Medications:     Clindamycin Phos-Benzoyl Perox 1-5 % External Gel, APPLY TO FACE USING A PEA SIZED AMOUNT EVERY DAY BEFORE NOON, Disp: , Rfl:     Adapalene 0.3 % External Gel, , Disp: , Rfl:       Review of Systems  A 10 point Review of Systems has been completed by me today and is negative except as above in the HPI.    Physical Findings   Pulse 84   Temp 97.8 °F (36.6 °C)   Resp 18   Ht 70.5\"   Wt 176 lb (79.8 kg)   SpO2 98%   BMI 24.90 kg/m²   Gen/psych: alert  and oriented, cooperative, no apparent distress  Cardiovascular: regular rate  Respiratory: respirations unlabored, no wheeze  Abdominal: soft, non-tender, non-distended, no guarding/rebound, left groin incision is clean dry and intact, no erythema         Assessment/Plan  1. Post-operative state        23 year old male who is status post open left inguinal hernia repair on 5/20 presents for follow up.  Doing well from surgery. Discussed with him activity and post op expectations. No post operative issues. I have no further follow-up scheduled with this patient at this time.  This patient can see me on an as-needed basis.  This patient should return urgently for any problems or complications related to my surgical intervention.         Follow Up  Return if symptoms worsen or fail to improve.    Josias Kothari MD  [facility]

## 2024-10-14 ENCOUNTER — OFFICE VISIT (OUTPATIENT)
Dept: FAMILY MEDICINE CLINIC | Facility: CLINIC | Age: 24
End: 2024-10-14
Payer: COMMERCIAL

## 2024-10-14 VITALS
TEMPERATURE: 97 F | SYSTOLIC BLOOD PRESSURE: 120 MMHG | OXYGEN SATURATION: 99 % | WEIGHT: 188.38 LBS | BODY MASS INDEX: 26.97 KG/M2 | HEIGHT: 70 IN | DIASTOLIC BLOOD PRESSURE: 68 MMHG | HEART RATE: 71 BPM | RESPIRATION RATE: 18 BRPM

## 2024-10-14 DIAGNOSIS — Z23 NEED FOR VACCINATION: ICD-10-CM

## 2024-10-14 DIAGNOSIS — Z00.00 WELLNESS EXAMINATION: Primary | ICD-10-CM

## 2024-10-14 DIAGNOSIS — E55.9 VITAMIN D DEFICIENCY: ICD-10-CM

## 2024-10-14 DIAGNOSIS — R53.83 OTHER FATIGUE: ICD-10-CM

## 2024-10-14 DIAGNOSIS — Z00.00 LABORATORY EXAMINATION ORDERED AS PART OF A ROUTINE GENERAL MEDICAL EXAMINATION: ICD-10-CM

## 2024-10-14 RX ORDER — KETOCONAZOLE 20 MG/ML
SHAMPOO TOPICAL
COMMUNITY
Start: 2024-07-11

## 2024-10-14 NOTE — PROGRESS NOTES
CHIEF COMPLAINT:     Chief Complaint   Patient presents with    Well Adult     Patient here for yearly physical and lab work.       HPI:   Ulisses Medina is a 24 year old male who presents for a complete physical exam.     Patient has concerns of fatigue.      Imms-    Immunization History   Administered Date(s) Administered    BCG 12/25/2001    Comvax 11/30/2001, 02/05/2002, 04/25/2002    Covid-19 Vaccine Pfizer 30 mcg/0.3 ml 03/10/2021, 03/11/2021, 04/08/2021    Covid-19 Vaccine Pfizer Juan-Sucrose 30 mcg/0.3 ml 01/29/2022    DTAP 11/13/2001, 02/05/2002, 04/25/2002, 05/15/2003, 07/06/2006, 09/30/2008    FLU VAC QIV SPLIT 3 YRS AND OLDER (50503) 10/07/2015, 11/09/2016, 11/06/2017, 11/24/2021    FLUZONE 6 months and older PFS 0.5 ml (71822) 10/31/2014, 09/22/2018, 10/26/2020    HEP A 05/10/2007, 08/30/2010    HEP B 11/30/2001, 02/08/2002, 05/15/2003    HPV (Gardasil) 08/13/2013, 10/01/2013, 05/05/2014    IPV 11/30/2001, 02/05/2002, 10/15/2002, 05/15/2003, 10/15/2003    Influenza 10/15/2005, 10/13/2006, 10/09/2013, 10/06/2022    Influenza Vaccine, trivalent (IIV3), PF 0.5mL (39440) 10/14/2024    Influenza(Afluria)0.5ml QIV PFS 10/26/2020    MMR 10/15/2002, 07/06/2006, 09/30/2006, 08/13/2013, 10/01/2013, 05/05/2014    Meningococcal-Menactra 08/13/2013, 12/21/2018    OPV 09/30/2008    Pneumococcal (Prevnar 7) 11/30/2001, 04/25/2002    TDAP 01/13/2012    Tb Intradermal Test 06/15/2006    Typhoid 09/30/2008    Varicella 10/15/2002, 05/10/2007        Prostate cancer screening-Colon cancer screening- Na per guidelines.    Dental/Eye Check up-  Recommended pt see dentist once every 6 months for a cleaning and once every year for an eye exam.       Current Outpatient Medications   Medication Sig Dispense Refill    ketoconazole 2 % External Shampoo       Clindamycin Phos-Benzoyl Perox 1-5 % External Gel APPLY TO FACE USING A PEA SIZED AMOUNT EVERY DAY BEFORE NOON      Adapalene 0.3 % External Gel         Past  Medical History:    Allergic rhinitis    spring and fall    Visual impairment    CONTACTS      Past Surgical History:   Procedure Laterality Date    Patient denies any surgical history        Family History   Problem Relation Age of Onset    Diabetes Maternal Grandfather     High Cholesterol Maternal Grandfather     Other (Other) Mother         allergic rhinitis      Social History:  Social History     Socioeconomic History    Marital status: Single   Tobacco Use    Smoking status: Never     Passive exposure: Never    Smokeless tobacco: Never   Vaping Use    Vaping status: Never Used   Substance and Sexual Activity    Alcohol use: Never    Drug use: Never      Exercise: three times per week.  Diet: watches sugar closely     REVIEW OF SYSTEMS:   GENERAL: Slight fatigue.Feels well otherwise  SKIN: denies any unusual skin lesions  EYES:denies blurred vision or double vision  HEENT: denies nasal congestion, sinus pain or ST  LUNGS: denies shortness of breath at rest on on exertion  CARDIOVASCULAR: denies chest pain or palpitations   GI: denies abdominal pain or heartburn.  No N/V/C/D.  : denies nocturia or changes in stream  MUSCULOSKELETAL: denies back pain or other joint pain  NEURO: denies headaches  PSYCHE: denies depression or anxiety  HEMATOLOGIC: denies hx of anemia or other bleeding disorders  ENDOCRINE: denies thyroid history  ALLERGY/ASTHMA: denies hx of seasonal allergies or asthma    EXAM:   /68 (BP Location: Left arm, Patient Position: Sitting, Cuff Size: adult)   Pulse 71   Temp 97.1 °F (36.2 °C) (Temporal)   Resp 18   Ht 5' 10\" (1.778 m)   Wt 188 lb 6.4 oz (85.5 kg)   SpO2 99%   BMI 27.03 kg/m²   Body mass index is 27.03 kg/m².     GENERAL: well developed, well nourished,in no apparent distress  SKIN: no rashes,no suspicious lesions  HEENT: atraumatic, normocephalic,ears and throat are clear  EYES:PERRLA, EOMI, normal optic disk,conjunctiva are clear  NECK: supple,no adenopathy,no  bruits  CHEST: no chest tenderness  LUNGS: Clear to auscultation bilaterally. No diminished breath sounds. No wheezing, rhonchi, or rales.  CARDIO: RRR without murmur  GI: BS's present x4., No tenderness of palpation.  No obvious masses or palpable organomegaly   MUSCULOSKELETAL: back is not tender, ROM of the back fully intact  EXTREMITIES: no cyanosis, clubbing or edema  NEURO: Alert & Oriented x3. Cranial nerves are grossly intact.     ASSESSMENT AND PLAN:   Ulisses Medina is a 24 year old male who presents for an annual physical exam.     ASSESSMENT & PLAN:    1. Wellness examination  Discussion about general health and wellness screening.   Patient is agreeable to getting lab work done to evaluate and monitor as part of screening and prevention of health issues.  Discussion about general diet and increasing activity.  Discussion about taking daily multivitamin.   Will discuss any abnormal values if they arise.      2. Laboratory examination ordered as part of a routine general medical examination  - CBC With Differential With Platelet; Future  - Comp Metabolic Panel (14); Future  - Lipid Panel; Future  - TSH W Reflex To Free T4; Future    3. Vitamin D deficiency  - Vitamin D [E]; Future    4. Other fatigue  - Iron And Tibc; Future  - Ferritin; Future  - Testosterone, Total Free, Male [E]; Future    5. Need for vaccination  - Immunization Admin Counseling, 1st Component, 18 years and older  - Fluzone trivalent vaccine, PF 0.5mL, 6mo+ (17089)           Patient's questions/concerns were addressed and answered. Patient is in agreement with treatment plan.

## 2024-11-26 ENCOUNTER — TELEPHONE (OUTPATIENT)
Facility: LOCATION | Age: 24
End: 2024-11-26

## 2024-11-26 NOTE — TELEPHONE ENCOUNTER
LEFT INGUINAL HERNIA REPAIR WITH MESH with Dr Kothari on 5/20/24.  Patient reports having a slight pain at his incision site during a 8 mile hike that he had to increase his pace at the end due to finishing before night fall.   Per patient this was the first hike of this distance he has completed since surgery. Denies any swelling, or redness and pain has dissipated.

## 2024-11-26 NOTE — TELEPHONE ENCOUNTER
Patient calling because he went hiking a few days ago, and started to experience some slight pain at the incision site. Was feeling the pain for a couple seconds, but it's good now. Pain is not chronic, not feeling it now, didn't feel anything during the hike. Just felt some pain after the hike for a couple of seconds. Wants to make sure that there's nothing major happening.     Please advise  Best callback number is [phone]

## 2024-12-05 ENCOUNTER — TELEPHONE (OUTPATIENT)
Facility: LOCATION | Age: 24
End: 2024-12-05

## 2025-01-07 ENCOUNTER — OFFICE VISIT (OUTPATIENT)
Facility: LOCATION | Age: 25
End: 2025-01-07
Payer: COMMERCIAL

## 2025-01-07 VITALS
HEART RATE: 52 BPM | SYSTOLIC BLOOD PRESSURE: 113 MMHG | RESPIRATION RATE: 18 BRPM | TEMPERATURE: 98 F | DIASTOLIC BLOOD PRESSURE: 74 MMHG | OXYGEN SATURATION: 98 %

## 2025-01-07 DIAGNOSIS — R10.32 LEFT GROIN PAIN: Primary | ICD-10-CM

## 2025-01-07 PROCEDURE — 99213 OFFICE O/P EST LOW 20 MIN: CPT | Performed by: STUDENT IN AN ORGANIZED HEALTH CARE EDUCATION/TRAINING PROGRAM

## 2025-01-07 NOTE — PROGRESS NOTES
Follow Up Visit Note       Active Problems      1. Left groin pain          Chief Complaint   Chief Complaint   Patient presents with    [facility]     EP- Left Groin Pain, 5/2024 left inguinal hernia repair- reports pain with movement          History of Present Illness  23 year old male who is status post open left inguinal hernia repair with mesh on 5/20/2024 presents for follow up related to new groin pain that started 6 weeks ago after a significant hike. At first, patient describes the pain as sharp, stabbing, but after the first week it started to shift to an ache that reduced in intensity, however it has shifted and radiates up to his oblique muscles. Patient reports pain is improved by holding in his abdominal muscles. He states that he has not exercised since the first week of the pain, wanting to make sure he got checked out before trying any additional exercises. He denies any fever, chills or other symptoms.      Allergies  Ulisses has No Known Allergies.    Past Medical / Surgical / Social / Family History    The past medical and past surgical history have been reviewed by me today.    Past Medical History:    Allergic rhinitis    spring and fall    Visual impairment    CONTACTS     Past Surgical History:   Procedure Laterality Date    Hernia surgery  05/20/2024    LEFT INGUINAL HERNIA REPAIR WITH MESH    Patient denies any surgical history         The family history and social history have been reviewed by me today.    Family History   Problem Relation Age of Onset    Diabetes Maternal Grandfather     High Cholesterol Maternal Grandfather     Other (Other) Mother         allergic rhinitis     Social History     Socioeconomic History    Marital status: Single   Tobacco Use    Smoking status: Never     Passive exposure: Never    Smokeless tobacco: Never   Vaping Use    Vaping status: Never Used   Substance and Sexual Activity    Alcohol use: Never    Drug use: Never        Current Outpatient  Medications:     ketoconazole 2 % External Shampoo, , Disp: , Rfl:     Clindamycin Phos-Benzoyl Perox 1-5 % External Gel, APPLY TO FACE USING A PEA SIZED AMOUNT EVERY DAY BEFORE NOON, Disp: , Rfl:     Adapalene 0.3 % External Gel, , Disp: , Rfl:      Review of Systems  The Review of Systems has been reviewed by me during today.  Review of Systems   Constitutional: Negative.    HENT: Negative.     Eyes: Negative.    Respiratory: Negative.     Cardiovascular: Negative.    Gastrointestinal: Negative.    Genitourinary: Negative.         Positive for groin pain   Musculoskeletal: Negative.    Skin: Negative.    Neurological: Negative.    Psychiatric/Behavioral: Negative.          Physical Findings   /74   Pulse 52   Temp 98 °F (36.7 °C) (Temporal)   Resp 18   SpO2 98%   Physical Exam  Constitutional:       Appearance: He is well-developed.   Cardiovascular:      Rate and Rhythm: Normal rate and regular rhythm.      Heart sounds: Normal heart sounds.   Pulmonary:      Effort: Pulmonary effort is normal.      Breath sounds: Normal breath sounds.   Abdominal:      General: A surgical scar is present. Bowel sounds are normal.      Palpations: Abdomen is soft.      Comments: Well healed incision from prior inguinal hernia repair. No recurrence of hernia.   Skin:     General: Skin is warm and dry.   Neurological:      Mental Status: He is alert and oriented to person, place, and time.      Deep Tendon Reflexes: Reflexes are normal and symmetric.              Assessment/Plan  1. Left groin pain        23 year old male who is status post open left inguinal hernia repair on 5/20/2025 presents for follow up on new left groin pain. Patient with recent suspected injury to left groin with pain that radiates from the left groin up into the left oblique muscles. Pain is improved b tightening abdominal muscles. Based on findings I recommended continued rest and use of OTC Ibuprofen every 8 hours for 2 weeks. I also ordered a  CT of the abdomen and pelvis with IV contrast to rule out a recurrence of a hernia. He will folow up in 2 weeks via telephone to review results of CT scan.     No orders of the defined types were placed in this encounter.      Imaging & Referrals   CT ABDOMEN+PELVIS(CONTRAST ONLY)(CPT=74177)    Follow Up  No follow-ups on file.    The documentation for this encounter was entered by Ann Castillo acting as a Medical Scribe for Dr. Kothari.    All medical record entries made by Scribe were at my direction and personally dictated by me. I have reviewed the chart and agree that the record accurately reflects my personal performance of the history, physical exam, assessment and plan. I have personally directed, reviewed, and agree with the instructions.    Josias Kothari MD

## 2025-01-10 ENCOUNTER — HOSPITAL ENCOUNTER (OUTPATIENT)
Dept: CT IMAGING | Facility: HOSPITAL | Age: 25
Discharge: HOME | End: 2025-01-10
Attending: STUDENT IN AN ORGANIZED HEALTH CARE EDUCATION/TRAINING PROGRAM
Payer: COMMERCIAL

## 2025-01-10 DIAGNOSIS — R10.32 LEFT GROIN PAIN: ICD-10-CM

## 2025-01-10 LAB
CREAT BLD-MCNC: 1.3 MG/DL
EGFRCR SERPLBLD CKD-EPI 2021: 79 ML/MIN/1.73M2 (ref 60–?)

## 2025-01-10 PROCEDURE — 74177 CT ABD & PELVIS W/CONTRAST: CPT | Performed by: STUDENT IN AN ORGANIZED HEALTH CARE EDUCATION/TRAINING PROGRAM

## 2025-01-10 PROCEDURE — 82565 ASSAY OF CREATININE: CPT

## 2025-01-22 ENCOUNTER — VIRTUAL PHONE E/M (OUTPATIENT)
Facility: LOCATION | Age: 25
End: 2025-01-22
Payer: COMMERCIAL

## 2025-01-22 DIAGNOSIS — R10.32 LEFT GROIN PAIN: Primary | ICD-10-CM

## 2025-02-08 NOTE — PROGRESS NOTES
Follow Up Visit Note       Active Problems      1. Left groin pain          Chief Complaint   No chief complaint on file.        History of Present Illness  24 year old male underwent open left inguinal hernia repair with mesh in May of 2024  Recently patient had severe left groin pain after extraneous hike. He has been resting and avoiding exercise since. He reports pain has improved significantly. He underwent a CT scan of the pelvis and this shows inflammation in the left groin without any evidence of hernia recurrence.     Imaging/Lab Results    Allergies  Ulisses has No Known Allergies.    Past Medical / Surgical / Social / Family History    The past medical and past surgical history have been reviewed by me today.    Past Medical History:    Allergic rhinitis    spring and fall    Visual impairment    CONTACTS     Past Surgical History:   Procedure Laterality Date    Hernia surgery  05/20/2024    LEFT INGUINAL HERNIA REPAIR WITH MESH    Patient denies any surgical history         The family history and social history have been reviewed by me today.    Family History   Problem Relation Age of Onset    Diabetes Maternal Grandfather     High Cholesterol Maternal Grandfather     Other (Other) Mother         allergic rhinitis     Social History     Socioeconomic History    Marital status: Single   Tobacco Use    Smoking status: Never     Passive exposure: Never    Smokeless tobacco: Never   Vaping Use    Vaping status: Never Used   Substance and Sexual Activity    Alcohol use: Never    Drug use: Never        Current Outpatient Medications:     ketoconazole 2 % External Shampoo, , Disp: , Rfl:     Clindamycin Phos-Benzoyl Perox 1-5 % External Gel, APPLY TO FACE USING A PEA SIZED AMOUNT EVERY DAY BEFORE NOON, Disp: , Rfl:     Adapalene 0.3 % External Gel, , Disp: , Rfl:      Review of Systems  The Review of Systems has been reviewed by me during today.  Review of Systems   Constitutional:  Negative for chills,  diaphoresis, fatigue and fever.   HENT:  Negative for ear discharge, ear pain and sore throat.    Eyes:  Negative for pain and discharge.   Respiratory:  Negative for cough, chest tightness and shortness of breath.    Cardiovascular:  Negative for chest pain, palpitations and leg swelling.   Gastrointestinal:  Negative for abdominal distention, abdominal pain, blood in stool, constipation, diarrhea, nausea and vomiting.   Genitourinary:  Negative for dysuria, frequency, hematuria and urgency.   Skin:  Negative for color change, pallor and rash.   Neurological:  Negative for weakness, light-headedness, numbness and headaches.   Hematological:  Negative for adenopathy. Does not bruise/bleed easily.   Psychiatric/Behavioral:  Negative for agitation and confusion.         Physical Findings   Not performed due to telephone visit         Assessment/Plan  1. Left groin pain        Ulisses Medina is a 24 year old male underwent left inguinal hernia repair in May 2024.   I reviewed the CT scan with him in detail. I do not see any evidence of hernia recurrence. There is some inflammation at the left groin without any other abnormality. This could be residual inflammatory changes from surgery or due to other inflammatory process. He reports symptoms have been improving with rest. Advised him to continue exercised that strengthen the abdominal core muscles at this time and gradually increase his exercise load. Patient to follow up as needed.          No orders of the defined types were placed in this encounter.      Imaging & Referrals   None    Follow Up  No follow-ups on file.    Josias Kothari MD

## (undated) DEVICE — SUT PROL 2-0 30IN SH NABSRB BLU L26MM 1/2 CIR

## (undated) DEVICE — SOLUTION IRRIG 1000ML 0.9% NACL USP BTL

## (undated) DEVICE — GLOVE SUR 7.5 SENSICARE PI PIP GRN PWD F

## (undated) DEVICE — SUT MCRYL 4-0 18IN PS-2 ABSRB UD 19MM 3/8 CIR

## (undated) DEVICE — BREAST-HERNIA-PORT CDS-LF: Brand: MEDLINE INDUSTRIES, INC.

## (undated) DEVICE — CURVED, LARGE JAW, OPEN SEALER/DIVIDER NANO-COATED: Brand: LIGASURE IMPACT

## (undated) DEVICE — ANTIBACTERIAL UNDYED BRAIDED (POLYGLACTIN 910), SYNTHETIC ABSORBABLE SUTURE: Brand: COATED VICRYL

## (undated) DEVICE — 3M™ IOBAN™ 2 ANTIMICROBIAL INCISE DRAPE 6648EZ: Brand: IOBAN™ 2

## (undated) DEVICE — GLOVE SUR 7 SENSICARE PI PIP CRM PWD F

## (undated) DEVICE — SKN PREP SPNG STKS PVP PNT STR: Brand: MEDLINE INDUSTRIES, INC.

## (undated) DEVICE — COVER LT HNDL RIG FOR SUR CAM DISP

## (undated) DEVICE — ADHESIVE SKIN TOP FOR WND CLSR DERMBND ADV

## (undated) DEVICE — UNDYED BRAIDED (POLYGLACTIN 910), SYNTHETIC ABSORBABLE SUTURE: Brand: COATED VICRYL

## (undated) DEVICE — SLEEVE COMPR MD KNEE LEN SGL USE KENDALL SCD

## (undated) DEVICE — VIOLET BRAIDED (POLYGLACTIN 910), SYNTHETIC ABSORBABLE SUTURE: Brand: COATED VICRYL

## (undated) DEVICE — SUT COAT VCRL + 2-0 18IN ABSRB UD ANTIBACT